# Patient Record
Sex: MALE | Race: WHITE | URBAN - NONMETROPOLITAN AREA
[De-identification: names, ages, dates, MRNs, and addresses within clinical notes are randomized per-mention and may not be internally consistent; named-entity substitution may affect disease eponyms.]

---
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
## 2023-03-16 ENCOUNTER — OFFICE VISIT (OUTPATIENT)
Dept: URBAN - NONMETROPOLITAN AREA CLINIC 13 | Facility: CLINIC | Age: 18
End: 2023-03-16
Payer: COMMERCIAL

## 2023-03-16 VITALS
HEART RATE: 94 BPM | BODY MASS INDEX: 22.28 KG/M2 | DIASTOLIC BLOOD PRESSURE: 62 MMHG | SYSTOLIC BLOOD PRESSURE: 107 MMHG | HEIGHT: 68 IN | WEIGHT: 147 LBS | TEMPERATURE: 98.3 F

## 2023-03-16 DIAGNOSIS — J02.9 SORE THROAT: ICD-10-CM

## 2023-03-16 DIAGNOSIS — K58.1 IRRITABLE BOWEL SYNDROME WITH CONSTIPATION: ICD-10-CM

## 2023-03-16 DIAGNOSIS — K21.9 GASTROESOPHAGEAL REFLUX DISEASE WITHOUT ESOPHAGITIS: ICD-10-CM

## 2023-03-16 PROBLEM — 440630006: Status: ACTIVE | Noted: 2022-08-15

## 2023-03-16 PROCEDURE — 99213 OFFICE O/P EST LOW 20 MIN: CPT | Performed by: NURSE PRACTITIONER

## 2023-03-16 RX ORDER — MONTELUKAST SODIUM 10 MG/1
TABLET, FILM COATED ORAL
Qty: 90 TABLET | Status: ON HOLD | COMMUNITY

## 2023-03-16 RX ORDER — FAMOTIDINE 40 MG/1
1 TABLET AT BEDTIME TABLET, FILM COATED ORAL ONCE A DAY
Qty: 90 TABLET | Refills: 3 | OUTPATIENT

## 2023-03-16 RX ORDER — OMEPRAZOLE 40 MG/1
1 CAPSULE 30 MINUTES BEFORE EVENING MEAL CAPSULE, DELAYED RELEASE ORAL ONCE A DAY
Qty: 90 | Refills: 3 | OUTPATIENT

## 2023-03-16 RX ORDER — SUCRALFATE 1 G/1
TABLET ORAL
Qty: 60 TABLET | Status: ON HOLD | COMMUNITY

## 2023-03-16 RX ORDER — OMEPRAZOLE 40 MG/1
CAPSULE, DELAYED RELEASE ORAL
Qty: 90 CAPSULE | Status: ACTIVE | COMMUNITY

## 2023-03-16 RX ORDER — OMEPRAZOLE 40 MG/1
1 CAPSULE 30 MINUTES BEFORE EVENING MEAL CAPSULE, DELAYED RELEASE ORAL ONCE A DAY
Qty: 90 | Refills: 3 | Status: ON HOLD | COMMUNITY
Start: 2022-08-15

## 2023-03-16 RX ORDER — AZELASTINE HYDROCHLORIDE 137 UG/1
SPRAY, METERED NASAL
Qty: 30 MILLILITER | Status: ACTIVE | COMMUNITY

## 2023-03-16 RX ORDER — FAMOTIDINE 40 MG/1
1 TABLET AT BEDTIME TABLET, FILM COATED ORAL ONCE A DAY
Qty: 90 TABLET | Refills: 3 | Status: ON HOLD | COMMUNITY
Start: 2022-08-15

## 2023-03-16 RX ORDER — ESCITALOPRAM 5 MG/1
TABLET, FILM COATED ORAL
Qty: 30 TABLET | Status: ACTIVE | COMMUNITY

## 2023-03-16 NOTE — HPI-OTHER HISTORIES
8/15/2022 Adolph Rodrigues is a 17 year old male here for reflux, dyspepsia, sore throat, and IBS-C. He has been followed by Dr Whitaker. He had an EGD 6/2021 with a normal esophagus, mild diffuse gastritis, and normal duonenum. Path is not available. He has been taking omeprazole 40mg at 9pm and IBGuard. He takes levsin and miralax prn. He continues to have some stomach pain when he first wakes and continues to have a sore throat through out the day. He also allergies and had testing in June. He also sees Dr Madsen and told reflux was likely the cause of his sore throat. 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

## 2023-03-16 NOTE — HPI-TODAY'S VISIT:
3/16/2023 Adolph Rodrigues is a 17 year old male here for reflux, dyspepsia, sore throat, and IBS-C. Since moving his omeprazole prior to his evening meal his reflux and sore throat have improved. He has not needed the pepcid. He also has allergies and it is the start of allergy season.  He has not needed the IBGuard, levsin or miralax. CS

## 2023-07-19 ENCOUNTER — OFFICE VISIT (OUTPATIENT)
Dept: URBAN - NONMETROPOLITAN AREA CLINIC 13 | Facility: CLINIC | Age: 18
End: 2023-07-19
Payer: COMMERCIAL

## 2023-07-19 ENCOUNTER — DASHBOARD ENCOUNTERS (OUTPATIENT)
Age: 18
End: 2023-07-19

## 2023-07-19 ENCOUNTER — WEB ENCOUNTER (OUTPATIENT)
Dept: URBAN - NONMETROPOLITAN AREA CLINIC 13 | Facility: CLINIC | Age: 18
End: 2023-07-19

## 2023-07-19 VITALS
DIASTOLIC BLOOD PRESSURE: 74 MMHG | WEIGHT: 143 LBS | TEMPERATURE: 97.5 F | HEIGHT: 68 IN | HEART RATE: 100 BPM | SYSTOLIC BLOOD PRESSURE: 109 MMHG | BODY MASS INDEX: 21.67 KG/M2

## 2023-07-19 DIAGNOSIS — K21.9 GASTROESOPHAGEAL REFLUX DISEASE WITHOUT ESOPHAGITIS: ICD-10-CM

## 2023-07-19 DIAGNOSIS — J02.9 SORE THROAT: ICD-10-CM

## 2023-07-19 DIAGNOSIS — K58.1 IRRITABLE BOWEL SYNDROME WITH CONSTIPATION: ICD-10-CM

## 2023-07-19 PROBLEM — 266435005: Status: ACTIVE | Noted: 2022-08-15

## 2023-07-19 PROCEDURE — 99213 OFFICE O/P EST LOW 20 MIN: CPT | Performed by: INTERNAL MEDICINE

## 2023-07-19 RX ORDER — FAMOTIDINE 40 MG/1
1 TABLET AT BEDTIME TABLET, FILM COATED ORAL ONCE A DAY
Qty: 90 TABLET | Refills: 3 | Status: ACTIVE | COMMUNITY

## 2023-07-19 RX ORDER — SUCRALFATE 1 G/1
TABLET ORAL
Qty: 60 TABLET | Status: ON HOLD | COMMUNITY

## 2023-07-19 RX ORDER — OMEPRAZOLE 40 MG/1
CAPSULE, DELAYED RELEASE ORAL
Qty: 90 CAPSULE | Status: ACTIVE | COMMUNITY

## 2023-07-19 RX ORDER — ESCITALOPRAM 5 MG/1
TABLET, FILM COATED ORAL
Qty: 30 TABLET | Status: ACTIVE | COMMUNITY

## 2023-07-19 RX ORDER — FAMOTIDINE 40 MG/1
1 TABLET 1-2 TIMES DAILY AS NEEDED TABLET, FILM COATED ORAL TWICE A DAY
Qty: 180 TABLET | Refills: 3 | OUTPATIENT

## 2023-07-19 RX ORDER — MONTELUKAST SODIUM 10 MG/1
TABLET, FILM COATED ORAL
Qty: 90 TABLET | Status: ON HOLD | COMMUNITY

## 2023-07-19 RX ORDER — OMEPRAZOLE 40 MG/1
1 CAPSULE 30 MINUTES BEFORE EVENING MEAL CAPSULE, DELAYED RELEASE ORAL ONCE A DAY
Qty: 90 | Refills: 3 | OUTPATIENT

## 2023-07-19 RX ORDER — AZELASTINE HYDROCHLORIDE 137 UG/1
SPRAY, METERED NASAL
Qty: 30 MILLILITER | Status: ACTIVE | COMMUNITY

## 2023-07-19 NOTE — HPI-OTHER HISTORIES
8/15/2022 Adolph Rodrigues is a 17 year old male here for reflux, dyspepsia, sore throat, and IBS-C. He has been followed by Dr Whitaker. He had an EGD 6/2021 with a normal esophagus, mild diffuse gastritis, and normal duonenum. Path is not available. He has been taking omeprazole 40mg at 9pm and IBGuard. He takes levsin and miralax prn. He continues to have some stomach pain when he first wakes and continues to have a sore throat through out the day. He also allergies and had testing in June. He also sees Dr Madsen and told reflux was likely the cause of his sore throat. 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 Since moving his omeprazole prior to his evening meal his reflux and sore throat have improved. He has not needed the pepcid. He also has allergies and it is the start of allergy season.  He has not needed the IBGuard, levsin or miralax. CS

## 2023-07-19 NOTE — HPI-TODAY'S VISIT:
7/19/2023 Adolph Rodrigues is a 17 year old male here for reflux, dyspepsia, sore throat, and IBS-C. Today, he is feeling  well. His reflux and sore throat are well controlled with omeprazole in the evening. he has not been taking the pepcid. He has had a few flares up but these only last for a short period and not bothersome enough for him to take the pepcid. He does not wish to wean at this time. Overall, he is feeling well. 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 CS. CS

## 2023-08-30 ENCOUNTER — TELEPHONE ENCOUNTER (OUTPATIENT)
Dept: URBAN - NONMETROPOLITAN AREA CLINIC 2 | Facility: CLINIC | Age: 18
End: 2023-08-30

## 2023-08-30 RX ORDER — OMEPRAZOLE 40 MG/1
1 CAPSULE 30 MINUTES BEFORE EVENING MEAL CAPSULE, DELAYED RELEASE ORAL ONCE A DAY
Qty: 90 | Refills: 3

## 2024-07-22 ENCOUNTER — OFFICE VISIT (OUTPATIENT)
Dept: URBAN - NONMETROPOLITAN AREA CLINIC 13 | Facility: CLINIC | Age: 19
End: 2024-07-22

## 2024-07-23 ENCOUNTER — OFFICE VISIT (OUTPATIENT)
Dept: URBAN - NONMETROPOLITAN AREA CLINIC 13 | Facility: CLINIC | Age: 19
End: 2024-07-23
Payer: COMMERCIAL

## 2024-07-23 VITALS
HEIGHT: 72 IN | BODY MASS INDEX: 18.69 KG/M2 | SYSTOLIC BLOOD PRESSURE: 108 MMHG | DIASTOLIC BLOOD PRESSURE: 73 MMHG | WEIGHT: 138 LBS | HEART RATE: 84 BPM

## 2024-07-23 DIAGNOSIS — J02.9 SORE THROAT: ICD-10-CM

## 2024-07-23 DIAGNOSIS — K21.9 GASTROESOPHAGEAL REFLUX DISEASE WITHOUT ESOPHAGITIS: ICD-10-CM

## 2024-07-23 DIAGNOSIS — K58.1 IRRITABLE BOWEL SYNDROME WITH CONSTIPATION: ICD-10-CM

## 2024-07-23 PROCEDURE — 99213 OFFICE O/P EST LOW 20 MIN: CPT | Performed by: NURSE PRACTITIONER

## 2024-07-23 RX ORDER — FAMOTIDINE 40 MG/1
1 TABLET 1-2 TIMES DAILY AS NEEDED TABLET, FILM COATED ORAL TWICE A DAY
Qty: 180 TABLET | Refills: 3 | OUTPATIENT

## 2024-07-23 RX ORDER — OMEPRAZOLE 40 MG/1
1 CAPSULE 30 MINUTES BEFORE EVENING MEAL CAPSULE, DELAYED RELEASE ORAL ONCE A DAY
Qty: 90 | Refills: 3 | OUTPATIENT

## 2024-07-23 RX ORDER — OMEPRAZOLE 40 MG/1
CAPSULE, DELAYED RELEASE ORAL
Qty: 90 CAPSULE | Status: ON HOLD | COMMUNITY

## 2024-07-23 RX ORDER — ESCITALOPRAM 5 MG/1
TABLET, FILM COATED ORAL
Qty: 30 TABLET | Status: ON HOLD | COMMUNITY

## 2024-07-23 RX ORDER — HYOSCYAMINE SULFATE 0.12 MG/1
1 TABLET UNDER THE TONGUE AND ALLOW TO DISSOLVE EVERY 4-6 HRS AS NEEDED FOR ABDOMINAL PAIN/SPASM TABLET, ORALLY DISINTEGRATING ORAL THREE TIMES A DAY
Qty: 90 | Refills: 3 | OUTPATIENT
Start: 2024-07-23 | End: 2024-11-19

## 2024-07-23 RX ORDER — FAMOTIDINE 40 MG/1
1 TABLET 1-2 TIMES DAILY AS NEEDED TABLET, FILM COATED ORAL TWICE A DAY
Qty: 180 TABLET | Refills: 3 | Status: ON HOLD | COMMUNITY

## 2024-07-23 RX ORDER — SUCRALFATE 1 G/1
TABLET ORAL
Qty: 60 TABLET | Status: ON HOLD | COMMUNITY

## 2024-07-23 RX ORDER — MONTELUKAST SODIUM 10 MG/1
TABLET, FILM COATED ORAL
Qty: 90 TABLET | Status: ON HOLD | COMMUNITY

## 2024-07-23 RX ORDER — OMEPRAZOLE 40 MG/1
1 CAPSULE 30 MINUTES BEFORE EVENING MEAL CAPSULE, DELAYED RELEASE ORAL ONCE A DAY
Qty: 90 | Refills: 3 | Status: ACTIVE | COMMUNITY

## 2024-07-23 RX ORDER — AZELASTINE HYDROCHLORIDE 137 UG/1
SPRAY, METERED NASAL
Qty: 30 MILLILITER | Status: ON HOLD | COMMUNITY

## 2024-07-23 NOTE — HPI-TODAY'S VISIT:
7/23/2024 Adolph Rodrigues is here for f/u of reflux, dyspepsia, sore throat, and IBS-C. Today, he is feeling  well. He did have some reflux issues last fall while getting use to his new schedule at school. This is back under control. He has had some on and off sore throat but does not feel this is reflux related. He has had some stomach pain after eating breakfast. This is not daily and often after eating a larger meal. He has not taken the pepcid or levsin. He is going to be a [employer] at [employer] in the dorms. CS

## 2024-07-23 NOTE — HPI-OTHER HISTORIES
8/15/2022 Adolph Rodrigues is a 17 year old male here for reflux, dyspepsia, sore throat, and IBS-C. He has been followed by Dr Whitaker. He had an EGD 6/2021 with a normal esophagus, mild diffuse gastritis, and normal duonenum. Path is not available. He has been taking omeprazole 40mg at 9pm and IBGuard. He takes levsin and miralax prn. He continues to have some stomach pain when he first wakes and continues to have a sore throat through out the day. He also allergies and had testing in June. He also sees Dr Madsen and told reflux was likely the cause of his sore throat. 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 Since moving his omeprazole prior to his evening meal his reflux and sore throat have improved. He has not needed the pepcid. He also has allergies and it is the start of allergy season.  He has not needed the IBGuard, levsin or miralax. CS  7/19/2023 Adolph Rodrigues is a 17 year old male here for reflux, dyspepsia, sore throat, and IBS-C. Today, he is feeling well. His reflux and sore throat are well controlled with omeprazole in the evening. he has not been taking the pepcid. He has had a few flares up but these only last for a short period and not bothersome enough for him to take the pepcid. He does not wish to wean at this time. Overall, he is feeling well. 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 CS. CS

## 2024-08-15 ENCOUNTER — WEB ENCOUNTER (OUTPATIENT)
Dept: URBAN - NONMETROPOLITAN AREA CLINIC 13 | Facility: CLINIC | Age: 19
End: 2024-08-15

## 2024-12-19 ENCOUNTER — OFFICE VISIT (OUTPATIENT)
Dept: URBAN - NONMETROPOLITAN AREA CLINIC 13 | Facility: CLINIC | Age: 19
End: 2024-12-19
Payer: COMMERCIAL

## 2024-12-19 VITALS
HEIGHT: 72 IN | WEIGHT: 134.8 LBS | HEART RATE: 92 BPM | DIASTOLIC BLOOD PRESSURE: 77 MMHG | BODY MASS INDEX: 18.26 KG/M2 | SYSTOLIC BLOOD PRESSURE: 111 MMHG

## 2024-12-19 DIAGNOSIS — J02.9 SORE THROAT: ICD-10-CM

## 2024-12-19 DIAGNOSIS — K58.1 IRRITABLE BOWEL SYNDROME WITH CONSTIPATION: ICD-10-CM

## 2024-12-19 DIAGNOSIS — K21.9 GASTROESOPHAGEAL REFLUX DISEASE WITHOUT ESOPHAGITIS: ICD-10-CM

## 2024-12-19 PROCEDURE — 99213 OFFICE O/P EST LOW 20 MIN: CPT | Performed by: NURSE PRACTITIONER

## 2024-12-19 RX ORDER — AZELASTINE HYDROCHLORIDE 137 UG/1
SPRAY, METERED NASAL
Qty: 30 MILLILITER | Status: ON HOLD | COMMUNITY

## 2024-12-19 RX ORDER — OMEPRAZOLE 40 MG/1
CAPSULE, DELAYED RELEASE ORAL
Qty: 90 CAPSULE | Status: ON HOLD | COMMUNITY

## 2024-12-19 RX ORDER — MONTELUKAST SODIUM 10 MG/1
TABLET, FILM COATED ORAL
Qty: 90 TABLET | Status: ON HOLD | COMMUNITY

## 2024-12-19 RX ORDER — FAMOTIDINE 40 MG/1
1 TABLET 1-2 TIMES DAILY AS NEEDED TABLET, FILM COATED ORAL TWICE A DAY
Qty: 180 TABLET | Refills: 3 | OUTPATIENT

## 2024-12-19 RX ORDER — OMEPRAZOLE 40 MG/1
1 CAPSULE 30 MINUTES BEFORE EVENING MEAL CAPSULE, DELAYED RELEASE ORAL ONCE A DAY
Qty: 90 | Refills: 3 | Status: ACTIVE | COMMUNITY

## 2024-12-19 RX ORDER — SUCRALFATE 1 G/1
TABLET ORAL
Qty: 60 TABLET | Status: ON HOLD | COMMUNITY

## 2024-12-19 RX ORDER — ESCITALOPRAM 5 MG/1
TABLET, FILM COATED ORAL
Qty: 30 TABLET | Status: ON HOLD | COMMUNITY

## 2024-12-19 RX ORDER — FAMOTIDINE 40 MG/1
1 TABLET 1-2 TIMES DAILY AS NEEDED TABLET, FILM COATED ORAL TWICE A DAY
Qty: 180 TABLET | Refills: 3 | Status: ACTIVE | COMMUNITY

## 2024-12-19 RX ORDER — OMEPRAZOLE 40 MG/1
1 CAPSULE 30 MINUTES BEFORE EVENING MEAL CAPSULE, DELAYED RELEASE ORAL ONCE A DAY
Qty: 90 | Refills: 3 | OUTPATIENT

## 2024-12-19 NOTE — HPI-TODAY'S VISIT:
12/19/2024 Adolph Rodrigues is here for f/u of reflux, dyspepsia, sore throat, and IBS-C. He has done really well the last couple of months. He will have slight flares of reflux and stomach pain when he starts back to school and the symptoms subside. He rarely needs the pepcid or levsin. His bowels are normal. CS

## 2024-12-19 NOTE — HPI-OTHER HISTORIES
8/15/2022 Adolph Rodrigues is a 17 year old male here for reflux, dyspepsia, sore throat, and IBS-C. He has been followed by Dr Whitaker. He had an EGD 6/2021 with a normal esophagus, mild diffuse gastritis, and normal duonenum. Path is not available. He has been taking omeprazole 40mg at 9pm and IBGuard. He takes levsin and miralax prn. He continues to have some stomach pain when he first wakes and continues to have a sore throat through out the day. He also allergies and had testing in June. He also sees Dr Madsen and told reflux was likely the cause of his sore throat. 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 Since moving his omeprazole prior to his evening meal his reflux and sore throat have improved. He has not needed the pepcid. He also has allergies and it is the start of allergy season.  He has not needed the IBGuard, levsin or miralax. CS  7/19/2023 Adolph Rodrigues is a 17 year old male here for reflux, dyspepsia, sore throat, and IBS-C. Today, he is feeling well. His reflux and sore throat are well controlled with omeprazole in the evening. he has not been taking the pepcid. He has had a few flares up but these only last for a short period and not bothersome enough for him to take the pepcid. He does not wish to wean at this time. Overall, he is feeling well. 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 CS. CS  7/23/2024 Adolph Rodrigues is here for f/u of reflux, dyspepsia, sore throat, and IBS-C. Today, he is feeling well. He did have some reflux issues last fall while getting use to his new schedule at school. This is back under control. He has had some on and off sore throat but does not feel this is reflux related. He has had some stomach pain after eating breakfast. This is not daily and often after eating a larger meal. He has not taken the pepcid or levsin. He is going to be a [employer] at [employer] andliving in the dorms. CS

## 2025-05-22 ENCOUNTER — OFFICE VISIT (OUTPATIENT)
Dept: URBAN - NONMETROPOLITAN AREA CLINIC 13 | Facility: CLINIC | Age: 20
End: 2025-05-22
Payer: COMMERCIAL

## 2025-05-22 DIAGNOSIS — J02.9 SORE THROAT: ICD-10-CM

## 2025-05-22 DIAGNOSIS — K58.1 IRRITABLE BOWEL SYNDROME WITH CONSTIPATION: ICD-10-CM

## 2025-05-22 DIAGNOSIS — K21.9 GASTROESOPHAGEAL REFLUX DISEASE WITHOUT ESOPHAGITIS: ICD-10-CM

## 2025-05-22 PROCEDURE — 99213 OFFICE O/P EST LOW 20 MIN: CPT | Performed by: NURSE PRACTITIONER

## 2025-05-22 RX ORDER — OMEPRAZOLE 40 MG/1
1 CAPSULE 30 MINUTES BEFORE EVENING MEAL CAPSULE, DELAYED RELEASE ORAL ONCE A DAY
Qty: 90 | Refills: 3 | Status: ACTIVE | COMMUNITY

## 2025-05-22 RX ORDER — MONTELUKAST SODIUM 10 MG/1
TABLET, FILM COATED ORAL
Qty: 90 TABLET | Status: ON HOLD | COMMUNITY

## 2025-05-22 RX ORDER — AZELASTINE HYDROCHLORIDE 137 UG/1
SPRAY, METERED NASAL
Qty: 30 MILLILITER | Status: ON HOLD | COMMUNITY

## 2025-05-22 RX ORDER — FAMOTIDINE 40 MG/1
1 TABLET 1-2 TIMES DAILY AS NEEDED TABLET, FILM COATED ORAL TWICE A DAY
Qty: 180 TABLET | Refills: 3 | Status: ACTIVE | COMMUNITY

## 2025-05-22 RX ORDER — SUCRALFATE 1 G/1
TABLET ORAL
Qty: 60 TABLET | Status: ON HOLD | COMMUNITY

## 2025-05-22 RX ORDER — ESCITALOPRAM 5 MG/1
TABLET, FILM COATED ORAL
Qty: 30 TABLET | Status: ON HOLD | COMMUNITY

## 2025-05-22 RX ORDER — OMEPRAZOLE 40 MG/1
CAPSULE, DELAYED RELEASE ORAL
Qty: 90 CAPSULE | Status: ON HOLD | COMMUNITY

## 2025-05-22 RX ORDER — FAMOTIDINE 40 MG/1
1 TABLET 1-2 TIMES DAILY AS NEEDED TABLET, FILM COATED ORAL TWICE A DAY
Qty: 180 TABLET | Refills: 3 | OUTPATIENT
Start: 2025-05-22

## 2025-05-22 RX ORDER — OMEPRAZOLE 20 MG/1
2 CAPSULE 30 MINUTES BEFORE EVENING MEAL CAPSULE, DELAYED RELEASE ORAL ONCE A DAY
Qty: 180 | Refills: 3 | OUTPATIENT
Start: 2025-05-22

## 2025-05-22 NOTE — HPI-OTHER HISTORIES
8/15/2022 Adolph Rodrigues is a 17 year old male here for reflux, dyspepsia, sore throat, and IBS-C. He has been followed by Dr Whitaker. He had an EGD 6/2021 with a normal esophagus, mild diffuse gastritis, and normal duonenum. Path is not available. He has been taking omeprazole 40mg at 9pm and IBGuard. He takes levsin and miralax prn. He continues to have some stomach pain when he first wakes and continues to have a sore throat through out the day. He also allergies and had testing in June. He also sees Dr Madsen and told reflux was likely the cause of his sore throat. 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 Since moving his omeprazole prior to his evening meal his reflux and sore throat have improved. He has not needed the pepcid. He also has allergies and it is the start of allergy season.  He has not needed the IBGuard, levsin or miralax. CS  7/19/2023 Adolph Rodrigues is a 17 year old male here for reflux, dyspepsia, sore throat, and IBS-C. Today, he is feeling well. His reflux and sore throat are well controlled with omeprazole in the evening. he has not been taking the pepcid. He has had a few flares up but these only last for a short period and not bothersome enough for him to take the pepcid. He does not wish to wean at this time. Overall, he is feeling well. 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 CS. CS  7/23/2024 Adolph Rodrigues is here for f/u of reflux, dyspepsia, sore throat, and IBS-C. Today, he is feeling well. He did have some reflux issues last fall while getting use to his new schedule at school. This is back under control. He has had some on and off sore throat but does not feel this is reflux related. He has had some stomach pain after eating breakfast. This is not daily and often after eating a larger meal. He has not taken the pepcid or levsin. He is going to be a [employer] at [employer] [facility] in the dorms. CS  12/19/2024 Adolph Rodrigues is here for f/u of reflux, dyspepsia, sore throat, and IBS-C. He has done really well the last couple of months. He will have slight flares of reflux and stomach pain when he starts back to school and the symptoms subside. He rarely needs the pepcid or levsin. His bowels are normal. CS

## 2025-05-22 NOTE — HPI-TODAY'S VISIT:
5/22/2025 Adolph Rodrigues is here for f/u of reflux, dyspepsia, sore throat, and IBS-C. He has done really well since his last OV. He did not have any issues at the start of school. he had some flares up around finals. Since being home, he had one flare after eating pizza but overall has done well. he is ready to drop to 20mg.  He is studying business at [employer] and hopes to graduate next year. CS

## 2025-06-18 ENCOUNTER — TELEPHONE ENCOUNTER (OUTPATIENT)
Dept: URBAN - NONMETROPOLITAN AREA CLINIC 2 | Facility: CLINIC | Age: 20
End: 2025-06-18

## 2025-06-18 RX ORDER — HYOSCYAMINE SULFATE 0.12 MG/1
1 TABLET UNDER THE TONGUE AND ALLOW TO DISSOLVE EVERY 4-6 HRS AS NEEDED FOR ABDOMINAL PAIN/SPASM TABLET, ORALLY DISINTEGRATING ORAL
Qty: 90 | Refills: 3
Start: 2024-07-23